# Patient Record
Sex: MALE | Race: ASIAN | NOT HISPANIC OR LATINO | Employment: FULL TIME | URBAN - METROPOLITAN AREA
[De-identification: names, ages, dates, MRNs, and addresses within clinical notes are randomized per-mention and may not be internally consistent; named-entity substitution may affect disease eponyms.]

---

## 2023-02-06 ENCOUNTER — OFFICE VISIT (OUTPATIENT)
Dept: FAMILY MEDICINE | Facility: CLINIC | Age: 33
End: 2023-02-06
Payer: COMMERCIAL

## 2023-02-06 VITALS
HEIGHT: 67 IN | TEMPERATURE: 98.3 F | DIASTOLIC BLOOD PRESSURE: 86 MMHG | SYSTOLIC BLOOD PRESSURE: 136 MMHG | WEIGHT: 148.2 LBS | BODY MASS INDEX: 23.26 KG/M2 | HEART RATE: 66 BPM | OXYGEN SATURATION: 99 % | RESPIRATION RATE: 20 BRPM

## 2023-02-06 DIAGNOSIS — Z02.89 ENCOUNTER FOR COMPLETION OF FORM WITH PATIENT: Primary | ICD-10-CM

## 2023-02-06 PROCEDURE — 99202 OFFICE O/P NEW SF 15 MIN: CPT | Mod: GC

## 2023-02-06 NOTE — LETTER
[facility] [address] [place] [address]  Phone: [phone]  Fax: [phone]    February 6, 2023        Eagle Cruz  [address]  [address]  [place] [address]          To whom it may concern:    RE: Eagle Cruz    Patient was seen and treated today at our clinic.  Patient may return to work 2/6/2023 with the following:  No working or lifting restrictions    Please contact me for questions or concerns.      Sincerely,        Yaneth Small MD

## 2023-02-06 NOTE — PROGRESS NOTES
"  Assessment & Plan     #Encounter for completion of form with patient  Patient who is new to clinic presented needing a work note.  Patient reports he had back pain 2 years ago, no trauma, left-sided without sciatica.  At that time he needed 1 month off for pain though has now completely recovered, no further back pain including with activity.  His work is asking that he get a medical note that says that is okay for him to work with no restriction.  Physical exam and ambulation is normal.  -Work note provided without restriction given no further pain      Yaneth Small MD  [facility]    Asher Guillermo is a 32 year old accompanied by his , presenting for the following health issues:  Other (New pt.  Need work note for no restriction)    Wants a work note for no restriction. He hasn't started the job yet but during the interview told them he had back pain and so they requested a work note about no or any restrictions.     Back pain: 2 years ago noticed back pain. At a different job needed 1 month off during the worst of the pain. Was previously at the left side of low back. No trauma. 6 months.     Job is cutting meat       Review of Systems   As above      Objective    /86   Pulse 66   Temp 98.3  F (36.8  C) (Oral)   Resp 20   Ht 1.689 m (5' 6.5\")   Wt 67.2 kg (148 lb 3.2 oz)   SpO2 99%   BMI 23.56 kg/m    Body mass index is 23.56 kg/m .  Physical Exam  Constitutional:       Appearance: Normal appearance.   Eyes:      Extraocular Movements: Extraocular movements intact.      Conjunctiva/sclera: Conjunctivae normal.   Pulmonary:      Effort: Pulmonary effort is normal. No respiratory distress.   Musculoskeletal:         General: Normal range of motion.      Cervical back: Normal range of motion.      Lumbar back: Normal. No swelling, deformity or tenderness. Normal range of motion.   Neurological:      General: No focal deficit present.      Mental Status: He is alert " and oriented to person, place, and time.      Gait: Gait normal.   Psychiatric:         Mood and Affect: Mood normal.         Behavior: Behavior normal.

## 2023-02-06 NOTE — PROGRESS NOTES
Preceptor Attestation:    I discussed the patient with the resident and evaluated the patient in person. I have verified the content of the note, which accurately reflects my assessment of the patient and the plan of care.   Supervising Physician:  Isiah Lee MD.

## 2023-02-06 NOTE — NURSING NOTE
name: Johan Guzman  Language: Dorothea  Agency: [facility]  Phone number: [phone]  Face to face spoken

## 2023-07-20 ENCOUNTER — PATIENT OUTREACH (OUTPATIENT)
Dept: CARE COORDINATION | Facility: CLINIC | Age: 33
End: 2023-07-20
Payer: COMMERCIAL

## 2023-07-20 NOTE — PROGRESS NOTES
Clinic Care Coordination Contact  Program:  [facility]: [place]   Renewal: [employer]   Date Applied:     WARREN Outreach:   7/20/23: CTA talked to pt and application is completed and turn in . CTA called to see if patient needed assistance with their [employer] Renewal. Patient declined needing assistance and no follow up needed   Merna Solano  Care   [facility]  Clinic Care Coordination  [phone]        Health Insurance:      Referral/Screening:

## 2023-09-05 ENCOUNTER — HOSPITAL ENCOUNTER (EMERGENCY)
Facility: CLINIC | Age: 33
Discharge: HOME | End: 2023-09-05
Admitting: PHYSICIAN ASSISTANT
Payer: OTHER MISCELLANEOUS

## 2023-09-05 VITALS
TEMPERATURE: 98.7 F | RESPIRATION RATE: 18 BRPM | DIASTOLIC BLOOD PRESSURE: 84 MMHG | HEART RATE: 74 BPM | OXYGEN SATURATION: 99 % | SYSTOLIC BLOOD PRESSURE: 130 MMHG

## 2023-09-05 DIAGNOSIS — S61.211A LACERATION OF LEFT INDEX FINGER WITHOUT FOREIGN BODY WITHOUT DAMAGE TO NAIL, INITIAL ENCOUNTER: ICD-10-CM

## 2023-09-05 PROCEDURE — 12002 RPR S/N/AX/GEN/TRNK2.6-7.5CM: CPT

## 2023-09-05 PROCEDURE — 99283 EMERGENCY DEPT VISIT LOW MDM: CPT | Mod: 25

## 2023-09-05 PROCEDURE — 90471 IMMUNIZATION ADMIN: CPT | Performed by: PHYSICIAN ASSISTANT

## 2023-09-05 PROCEDURE — 250N000011 HC RX IP 250 OP 636: Performed by: PHYSICIAN ASSISTANT

## 2023-09-05 PROCEDURE — 90715 TDAP VACCINE 7 YRS/> IM: CPT | Performed by: PHYSICIAN ASSISTANT

## 2023-09-05 PROCEDURE — 250N000009 HC RX 250: Performed by: PHYSICIAN ASSISTANT

## 2023-09-05 RX ORDER — GINSENG 100 MG
CAPSULE ORAL ONCE
Status: COMPLETED | OUTPATIENT
Start: 2023-09-05 | End: 2023-09-05

## 2023-09-05 RX ADMIN — BACITRACIN: 500 OINTMENT TOPICAL at 17:43

## 2023-09-05 RX ADMIN — CLOSTRIDIUM TETANI TOXOID ANTIGEN (FORMALDEHYDE INACTIVATED), CORYNEBACTERIUM DIPHTHERIAE TOXOID ANTIGEN (FORMALDEHYDE INACTIVATED), BORDETELLA PERTUSSIS TOXOID ANTIGEN (GLUTARALDEHYDE INACTIVATED), BORDETELLA PERTUSSIS FILAMENTOUS HEMAGGLUTININ ANTIGEN (FORMALDEHYDE INACTIVATED), BORDETELLA PERTUSSIS PERTACTIN ANTIGEN, AND BORDETELLA PERTUSSIS FIMBRIAE 2/3 ANTIGEN 0.5 ML: 5; 2; 2.5; 5; 3; 5 INJECTION, SUSPENSION INTRAMUSCULAR at 17:25

## 2023-09-05 ASSESSMENT — ENCOUNTER SYMPTOMS
NUMBNESS: 0
WOUND: 1
WEAKNESS: 0

## 2023-09-05 NOTE — Clinical Note
Eagle Cruz was seen and treated in our emergency department on 9/5/2023.  He may return to work on 09/06/2023.  He may have limited use of his left hand pointer finger for 10-14 days, and should be excused from activities that he needs to use this finger for.      If you have any questions or concerns, please don't hesitate to call.      Suyapa Bonds PA-C

## 2023-09-05 NOTE — DISCHARGE INSTRUCTIONS
As we discussed, we have placed 4 stitches in your left hand pointer finger which should be removed by healthcare professional in 10 to 14 days.  Please monitor the wound for signs of infection including increased pain, redness, swelling, puslike discharge and return to the ER if this develops.  This wound is under tension, please wear a finger splint when using your hand so that you do not tear open the wound.

## 2023-09-05 NOTE — ED NOTES
Deep cut across fingertip gushing blood when bandage removed. Bandage placed again and bleeding controlled.

## 2023-09-05 NOTE — ED TRIAGE NOTES
Pt c/o cutting 2nd finger on L hand when trying to sheath knife this afternoon. Bleeding controlled with bandage in triage. Unknown if TDAP up to date, thinks all vaccines up to date when moved to [place] in 2016. Pt alert.  used in triage.     Triage Assessment       Row Name 09/05/23 1616       Triage Assessment (Adult)    Airway WDL WDL       Respiratory WDL    Respiratory WDL WDL       Skin Circulation/Temperature WDL    Skin Circulation/Temperature WDL X  2nd finger laceration controlled with bandage in triage       Cardiac WDL    Cardiac WDL WDL       Peripheral/Neurovascular WDL    Peripheral Neurovascular WDL WDL       Cognitive/Neuro/Behavioral WDL    Cognitive/Neuro/Behavioral WDL WDL

## 2023-09-05 NOTE — ED PROVIDER NOTES
Emergency Department Encounter   NAME: Eagle Cruz ; AGE: 32 year old male ; YOB: 1990 ; MRN: [id number] ; PCP: Yaneth Small   ED PROVIDER: Suyapa Bonds PA-C    Evaluation Date & Time:   No admission date for patient encounter.    CHIEF COMPLAINT:  Laceration      Impression and Plan   MDM:   Eagle Cruz is a 32 year old male without a pertinent history who presents to the ED by walk in for evaluation of a finger cut.  The patient presents to the emergency department for evaluation of a laceration to his nondominant left hand finger pad that occurred when putting his knife back into its sheath at 2 PM this afternoon just prior to arrival in the ER.  He had placed his own tourniquet on the finger with tape which she applied 20 minutes prior to arrival.  Wound was bleeding heavily for nursing staff, however on my examination, bleeding has since subsided with direct pressure.  Tourniquet removed and wound examined through range of motion without any evidence of tendon injury or foreign body.  No brisk or pulsatile bleeding concerning for arterial injury.  Finger is warm and well-perfused with distal cap refill intact.  He has full range of motion and intact strength at all joints -no concern for tendon rupture.  Tetanus updated.  He does have subjective decrease sensation to light touch to the distal fingertip -we discussed that this could be due to nerve injury versus nerve praxia.  Wound was thoroughly irrigated with normal saline.  Tourniquet was applied to the finger for 15 minutes during laceration repair.  4 interrupted sutures placed.  After tourniquet removed, distal CMS unchanged.  We discussed wound care, monitoring for signs of infection, suture removal, activities to avoid, wound tension and using finger splint, and reasons to return to the emergency department.  Nursing staff cleaned the finger, apply dressing, and finger splint for the patient.  Work note provided.  Discharged in good  condition.    *All conversations today were had using a professional telephone Dorothea .    *Patient examination and workup was initiated in triage due to inpatient hospital and Emergency Department bed shortage resulting in long waiting room wait times. Patient and/or guardian's consent was obtained.       Medical Decision Making    History:  Supplemental history from: Documented in chart, if applicable  External Record(s) reviewed: Documented in chart, if applicable.    Work Up:  Chart documentation includes differential considered and any EKGs or imaging independently interpreted by provider, where specified.  In additional to work up documented, I considered the following work up: Documented in chart, if applicable.    External consultation:  Discussion of management with another provider: Documented in chart, if applicable    Complicating factors:  Care impacted by chronic illness: N/A  Care affected by social determinants of health: N/A    Disposition considerations: Discharge. No recommendations on prescription strength medication(s). See documentation for any additional details.      ED COURSE:  4:32 PM I met and introduced myself to the patient. I gathered initial history and performed my physical exam. We discussed plan for initial workup.   5:10 PM I rechecked the patient and discussed results, discharge, follow up, and reasons to return to the ED.     At the conclusion of the encounter I discussed the results of all the tests and the disposition. The questions were answered. The patient or family acknowledged understanding and was agreeable with the care plan.    FINAL IMPRESSION:    ICD-10-CM    1. Laceration of left index finger without foreign body without damage to nail, initial encounter  S61.211A             MEDICATIONS GIVEN IN THE EMERGENCY DEPARTMENT:  Medications   bacitracin ointment (has no administration in time range)   Tdap (tetanus-diphtheria-acell pertussis) (ADACEL) injection  0.5 mL (0.5 mLs Intramuscular $Given 9/5/23 1005)         NEW PRESCRIPTIONS STARTED AT TODAY'S ED VISIT:  New Prescriptions    No medications on file         HPI   Patient information was obtained from: Patient   Use of Intrepreter: Yes, on phone. Language: Dorothea Cruz is a 32 year old male without a pertinent history who presents to the ED by walk in for evaluation of a finger cut.     The patient reports that he cut a rope to tie to his knife, and when putting the knife back into sheath, he had accidentally cut his left pointer finger. He denies any weakness and numbness in his left pointer finger. The patient is right handed and received all of his vaccinations when he came to the [place]. His last tetanus shot was in 2016. He is not on blood thinners.       REVIEW OF SYSTEMS:  Review of Systems   Skin:  Positive for wound (finger laceration).   Neurological:  Negative for weakness and numbness.   All other systems reviewed and are negative.        Medical History     History reviewed. No pertinent past medical history.    History reviewed. No pertinent surgical history.    History reviewed. No pertinent family history.    Social History     Tobacco Use    Smoking status: Every Day     Types: Cigarettes    Smokeless tobacco: Current       No current outpatient medications on file.        Physical Exam     First Vitals:  Patient Vitals for the past 24 hrs:   BP Temp Temp src Pulse Resp SpO2   09/05/23 1615 130/84 98.7  F (37.1  C) Oral 74 18 99 %         PHYSICAL EXAM:   Physical Exam  Vitals and nursing note reviewed.   Constitutional:       General: He is not in acute distress.     Appearance: He is not toxic-appearing.   Musculoskeletal:      Comments: 4cm laceration to the left hand distal finger pad that does extend into subcutaneous tissue.  Bleeding has subsided with direct pressure prior to my evaluation.  Fingers warm and well-perfused with distal cap refill less than 2 seconds.  He does  report subjective decrease sensation to light touch to the distal fingertip though lateral and proximal to the wound sensation is intact.  He has full range of motion at all finger joints against resistance with intact strength.  Wound examined through range of motion without any evidence of underlying tendon rupture.  Wound appeared clear without foreign body.   Neurological:      Mental Status: He is alert.             Results     LAB:  All pertinent labs reviewed and interpreted  Labs Ordered and Resulted from Time of ED Arrival to Time of ED Departure - No data to display    RADIOLOGY:  No orders to display       PROCEDURES:  PROCEDURE: Laceration Repair   INDICATIONS: Laceration   PROCEDURE PROVIDER: Shruthi Bonds PA-C   SITE: Left pointer finger    TYPE/SIZE: subcutaneous, clean, and no foreign body visualized  4 cm (total length)   FUNCTIONAL ASSESSMENT: Distal sensation, circulation, motor, and tendon function intact   MEDICATION: See digital block note    PREPARATION: scrubbing and irrigation with Normal saline   DEBRIDEMENT: no debridement and wound explored, no foreign body found   CLOSURE:  Superficial layer closed with 4 stitches of 4-0 Ethilon simple interrupted    Total number of sutures/staples placed: 4       PROCEDURE: Digital Block   INDICATIONS: Finger laceration    PROCEDURE PROVIDER: Shruthi Bonds PA-C   SITE: Left index finger (2nd digit)   MEDICATION: 3 mL of 0.25% Bupivacaine without epinephrine   NOTE: The skin overlying the site for injection was prepped with chlorhexidine.  Needle was inserted in a standard three point injection pattern.  Each time the area was aspirated and there was no return of blood.  I then injected the medication at the base of the digit.  The patient had a good response to the procedure   COMPLICATIONS: Patient tolerated procedure well, without complication           ICharla, am serving as a scribe to document services personally performed by Suyapa  EMMANUEL Bonds, based on my observation and the provider's statements to me. I, Suyapa Bonds PA-C attest that Charla Layne is acting in a scribe capacity, has observed my performance of the services and has documented them in accordance with my direction.       Suyapa Bonds PA-C   Emergency Medicine   [facility] EMERGENCY ROOM       Suyapa Bonds PA-C  09/05/23 1748       Suyapa Bonds PA-C  09/05/23 1749

## 2024-01-09 ENCOUNTER — OFFICE VISIT (OUTPATIENT)
Dept: FAMILY MEDICINE | Facility: CLINIC | Age: 34
End: 2024-01-09
Payer: COMMERCIAL

## 2024-01-09 VITALS
RESPIRATION RATE: 18 BRPM | DIASTOLIC BLOOD PRESSURE: 92 MMHG | TEMPERATURE: 98.1 F | OXYGEN SATURATION: 100 % | HEART RATE: 62 BPM | WEIGHT: 151.4 LBS | HEIGHT: 66 IN | SYSTOLIC BLOOD PRESSURE: 137 MMHG | BODY MASS INDEX: 24.33 KG/M2

## 2024-01-09 DIAGNOSIS — R52 BODY ACHES: ICD-10-CM

## 2024-01-09 DIAGNOSIS — R05.1 ACUTE COUGH: Primary | ICD-10-CM

## 2024-01-09 LAB
FLUAV RNA SPEC QL NAA+PROBE: NEGATIVE
FLUBV RNA RESP QL NAA+PROBE: NEGATIVE
RSV RNA SPEC NAA+PROBE: NEGATIVE
SARS-COV-2 RNA RESP QL NAA+PROBE: NEGATIVE

## 2024-01-09 PROCEDURE — 99213 OFFICE O/P EST LOW 20 MIN: CPT | Performed by: STUDENT IN AN ORGANIZED HEALTH CARE EDUCATION/TRAINING PROGRAM

## 2024-01-09 PROCEDURE — 87637 SARSCOV2&INF A&B&RSV AMP PRB: CPT | Performed by: STUDENT IN AN ORGANIZED HEALTH CARE EDUCATION/TRAINING PROGRAM

## 2024-01-09 RX ORDER — BENZONATATE 100 MG/1
100 CAPSULE ORAL 3 TIMES DAILY PRN
Qty: 120 CAPSULE | Refills: 0 | Status: SHIPPED | OUTPATIENT
Start: 2024-01-09

## 2024-01-09 RX ORDER — GUAIFENESIN 1200 MG/1
1200 TABLET, EXTENDED RELEASE ORAL 2 TIMES DAILY
Qty: 60 TABLET | Refills: 0 | Status: SHIPPED | OUTPATIENT
Start: 2024-01-09

## 2024-01-09 NOTE — LETTER
January 9, 2024    RE:  Eagle Cruz                              [address]  [address]  [place] [address]        To whom it may concern:    Eagle Cruz is under my professional care for acute illness. Please excuse him from work starting 1/8/24 when symptoms started.  The employee is UNABLE to return to work until testing comes back. If testing is negative then he will be out of work until he has been afebrile for 24 hours.           Sincerely,        Richard oBnds MD

## 2024-01-09 NOTE — PROGRESS NOTES
Assessment & Plan   Body aches  Acute cough  Patient presents with 1 day of cough, chills, body aches.  Will test for respiratory illness.  Work note provided.  Symptomatic treatment sent to pharmacy.  - Symptomatic Influenza A/B, RSV, & SARS-CoV2 PCR (COVID-19); Future  - benzonatate (TESSALON) 100 MG capsule; Take 1 capsule (100 mg) by mouth 3 times daily as needed for cough  - guaiFENesin 1200 MG TB12; Take 1 tablet (1,200 mg) by mouth 2 times daily  - Symptomatic Influenza A/B, RSV, & SARS-CoV2 PCR (COVID-19) Nose               Nicotine/Tobacco Cessation:  He reports that he has been smoking cigarettes. He uses smokeless tobacco.  Nicotine/Tobacco Cessation Plan:   Information offered: Patient not interested at this time          No follow-ups on file.    Richard Bonds MD  [facility] LEONEL Guillermo is a 33 year old, presenting for the following health issues:  Sick (Muscle ache, cough, headache, fever, chills started on yesterday, )      1/9/2024    11:18 AM   Additional Questions   Roomed by ML   Accompanied by wife       HPI       Acute Illness  Acute illness concerns: 1 day of symptoms-headache, cough, body aches  Onset/Duration: 1 day  Symptoms:  Fever: No  Chills/Sweats: No  Headache (location?): YES  Sinus Pressure: No  Conjunctivitis:  No  Ear Pain: no  Rhinorrhea: No  Congestion: No  Sore Throat: No  Cough: YES  Wheeze: No  Decreased Appetite: No  Nausea: No  Vomiting: No  Diarrhea: No  Dysuria/Freq.: No  Dysuria or Hematuria: No  Fatigue/Achiness: YES  Sick/Strep Exposure: No  Therapies tried and outcome: None    This morning, patient woke up with headache, cough and body aches. He is also reporting some lower abdominal pain. No sick contacts, N/V, diarrhea, constipation, dysuria.     Review of Systems         Objective    BP (!) 137/92 (BP Location: Right arm, Patient Position: Sitting, Cuff Size: Adult Regular)   Pulse 62   Temp 98.1  F (36.7  C) (Oral)   Resp 18    "Ht 1.674 m (5' 5.91\")   Wt 68.7 kg (151 lb 6.4 oz)   SpO2 100%   BMI 24.51 kg/m    Body mass index is 24.51 kg/m .  Physical Exam  Constitutional:       Appearance: Normal appearance.   HENT:      Head: Normocephalic and atraumatic.      Nose: Nose normal.      Mouth/Throat:      Mouth: Mucous membranes are moist.   Eyes:      Extraocular Movements: Extraocular movements intact.      Conjunctiva/sclera: Conjunctivae normal.      Pupils: Pupils are equal, round, and reactive to light.   Cardiovascular:      Rate and Rhythm: Normal rate and regular rhythm.   Pulmonary:      Effort: Pulmonary effort is normal.      Breath sounds: Normal breath sounds.   Abdominal:      General: Abdomen is flat.      Palpations: Abdomen is soft.   Musculoskeletal:      Cervical back: Normal range of motion.   Skin:     General: Skin is warm and dry.      Capillary Refill: Capillary refill takes less than 2 seconds.   Neurological:      Mental Status: He is alert.                              "

## 2024-12-12 ENCOUNTER — OFFICE VISIT (OUTPATIENT)
Dept: FAMILY MEDICINE | Facility: CLINIC | Age: 34
End: 2024-12-12
Payer: COMMERCIAL

## 2024-12-12 VITALS
HEART RATE: 76 BPM | SYSTOLIC BLOOD PRESSURE: 129 MMHG | DIASTOLIC BLOOD PRESSURE: 80 MMHG | TEMPERATURE: 98.6 F | RESPIRATION RATE: 16 BRPM | BODY MASS INDEX: 26.55 KG/M2 | WEIGHT: 164 LBS | OXYGEN SATURATION: 97 %

## 2024-12-12 DIAGNOSIS — R05.1 ACUTE COUGH: Primary | ICD-10-CM

## 2024-12-12 RX ORDER — GUAIFENESIN/DEXTROMETHORPHAN 100-10MG/5
10 SYRUP ORAL EVERY 4 HOURS PRN
Qty: 473 ML | Refills: 0 | Status: SHIPPED | OUTPATIENT
Start: 2024-12-12 | End: 2024-12-19

## 2024-12-12 NOTE — LETTER
[facility] [address] [place] [address]  Phone: [phone]  Fax: [phone]    December 12, 2024        Eagle Cruz  [address]  [address]  [place] [address]          To whom it may concern:    RE: Eagle Cruz    Patient was seen and treated today at our clinic.  He is unable to work from Dec 11 through Dec 13.      Please contact me for questions or concerns.      Sincerely,      Bennett Dubois

## 2024-12-12 NOTE — PROGRESS NOTES
There are no active problems to display for this patient.    There are no exam notes on file for this visit.  Chief Complaint   Patient presents with    other     Cold symptoms      Blood pressure 129/80, pulse 76, temperature 98.6  F (37  C), temperature source Oral, resp. rate 16, weight 74.4 kg (164 lb), SpO2 97%.  No results found for any visits on 12/12/24.    Cough and RN for 3 days  Achy and chills.  Not short of breath.    No ear pain.    Took tylenol.  Taste and smell ok.      No exposure.  Lives w/ family.  7 total people @ home.  No influenza or COVID vaccines.  No clear exposure    Vitals reviewed they are normal.  Patient is alert pleasant no acute distress.  TMs are clear.  Throat negative.  He chews betel not.  Neck supple no adenopathy.  Chest clear.  Heart regular rate and rhythm.    Assessment/plan  Acute cough.  Likely viral syndrome.  He is on the back end of the influenza treatment window given that his symptoms began just over 48 hours ago so I did not swab him for influenza.  COVID swab obtained.  Symptomatic care discussed.  Robitussin given.  Work note given excusing him from December 11 through 13.  Follow-up as needed

## 2024-12-12 NOTE — LETTER
2024    Eagle Cruz   1990        To Whom it May Concern;    I saw and evaluated Eagle Cruz today.  Please excuse Eagle Cruz from work/school for a healthcare visit on from Dec 11 through Dec 13, 2024, due to a viral upper respiratory infection.   I anticipate that he may be able to return on Dec 16th, if his symptoms are improved.    Sincerely,        Bennett Dubois MD